# Patient Record
Sex: MALE | Race: WHITE
[De-identification: names, ages, dates, MRNs, and addresses within clinical notes are randomized per-mention and may not be internally consistent; named-entity substitution may affect disease eponyms.]

---

## 2017-08-15 ENCOUNTER — HOSPITAL ENCOUNTER (OUTPATIENT)
Dept: HOSPITAL 39 - GMAJ | Age: 77
Discharge: HOME | End: 2017-08-15
Attending: FAMILY MEDICINE
Payer: MEDICARE

## 2017-08-15 DIAGNOSIS — Z12.5: Primary | ICD-10-CM

## 2018-09-04 ENCOUNTER — HOSPITAL ENCOUNTER (OUTPATIENT)
Dept: HOSPITAL 39 - GMAJ | Age: 78
End: 2018-09-04
Attending: FAMILY MEDICINE
Payer: MEDICARE

## 2018-09-04 DIAGNOSIS — Z12.5: Primary | ICD-10-CM

## 2018-10-25 ENCOUNTER — HOSPITAL ENCOUNTER (OUTPATIENT)
Dept: HOSPITAL 39 - AMB | Age: 78
Discharge: HOME | End: 2018-10-25
Attending: FAMILY MEDICINE
Payer: MEDICARE

## 2018-10-25 VITALS — DIASTOLIC BLOOD PRESSURE: 81 MMHG | SYSTOLIC BLOOD PRESSURE: 130 MMHG | TEMPERATURE: 97.2 F | OXYGEN SATURATION: 100 %

## 2018-10-25 DIAGNOSIS — Q43.8: ICD-10-CM

## 2018-10-25 DIAGNOSIS — N40.0: ICD-10-CM

## 2018-10-25 DIAGNOSIS — Z79.82: ICD-10-CM

## 2018-10-25 DIAGNOSIS — I65.29: ICD-10-CM

## 2018-10-25 DIAGNOSIS — D12.2: ICD-10-CM

## 2018-10-25 DIAGNOSIS — Z79.899: ICD-10-CM

## 2018-10-25 DIAGNOSIS — Z12.11: Primary | ICD-10-CM

## 2018-10-25 DIAGNOSIS — E78.00: ICD-10-CM

## 2018-10-25 DIAGNOSIS — I10: ICD-10-CM

## 2018-10-25 DIAGNOSIS — K57.30: ICD-10-CM

## 2018-10-25 PROCEDURE — 45380 COLONOSCOPY AND BIOPSY: CPT

## 2018-10-25 PROCEDURE — 00812 ANES LWR INTST SCR COLSC: CPT

## 2018-10-25 PROCEDURE — 88305 TISSUE EXAM BY PATHOLOGIST: CPT

## 2018-10-25 RX ADMIN — SODIUM CHLORIDE, POTASSIUM CHLORIDE, SODIUM LACTATE AND CALCIUM CHLORIDE ONE MLS: 600; 310; 30; 20 INJECTION, SOLUTION INTRAVENOUS at 07:15

## 2018-10-25 NOTE — OP
DATE OF PROCEDURE:  10/25/18



PREOPERATIVE DIAGNOSIS:

1.  Colon cancer screen.



POSTOPERATIVE DIAGNOSIS:

1.  Sigmoid diverticulosis.

2.  Colonic polyp of the proximal ascending colon just distal to the ileocecal 
valve.



PROCEDURE:

1.  Colonoscopy with polypectomy.



SURGEON:  Michael Jiménez MD.



ANESTHESIA:  MAC by Jm Johnson CRNA.



ESTIMATED BLOOD LOSS:  2 mL.



COMPLICATIONS:  None apparent.



TECHNIQUE:  After informed consent was obtained from the patient, the patient 
was taken to the Endoscopy Suite and put in the left lateral decubitus 
position.  After adequate IV sedation was obtained, a digital rectal exam was 
performed which revealed decreased sphincter tone, no intraluminal masses, and 
a smooth, 1+, anodular prostate.  The colonoscope was then passed with good 
visualization all the way through the colon.  The bowel prep was good.  The 
sigmoid was redundant and it took a good bit of maneuvering to get the scope 
advanced all the way to the cecum.  There were a few scattered diverticula 
noted in the sigmoid colon.  The cecum was identified by the presence of the 
ileocecal valve.  Just distal to the ileocecal valve in the proximal portion of 
the right colon, the patient had a dual-headed sessile polyp.  A cold biopsy 
forceps sample was taken.  There was good hemostasis.  Based on the fact that 
this two-headed polyp had a common base, I then used the hot snare procedure to 
remove the rest of the polyp.  The polyp was retrieved in the trap.  There was 
good hemostasis from the polypectomy site.  Over the next 8 minutes, the scope 
was removed and a good look at the remaining colon was obtained.  There were no 
other polyps or masses identified.  The scope was removed.  The patient 
tolerated the procedure well.  The patient was transported to the outpatient 
area in good condition.  



The patient will followup with me in one week for biopsy report.  He will need 
another colonoscopy within the next three years.  If his biopsy proves to show 
any malignancy, he will need surgery instead.



#[id number]
[facility]